# Patient Record
Sex: MALE | Race: OTHER | HISPANIC OR LATINO | Employment: FULL TIME | URBAN - METROPOLITAN AREA
[De-identification: names, ages, dates, MRNs, and addresses within clinical notes are randomized per-mention and may not be internally consistent; named-entity substitution may affect disease eponyms.]

---

## 2023-08-21 ENCOUNTER — OFFICE VISIT (OUTPATIENT)
Dept: URGENT CARE | Facility: CLINIC | Age: 58
End: 2023-08-21
Payer: COMMERCIAL

## 2023-08-21 VITALS
RESPIRATION RATE: 16 BRPM | SYSTOLIC BLOOD PRESSURE: 150 MMHG | WEIGHT: 198.41 LBS | TEMPERATURE: 98.6 F | HEART RATE: 66 BPM | DIASTOLIC BLOOD PRESSURE: 92 MMHG | OXYGEN SATURATION: 95 %

## 2023-08-21 DIAGNOSIS — H66.001 NON-RECURRENT ACUTE SUPPURATIVE OTITIS MEDIA OF RIGHT EAR WITHOUT SPONTANEOUS RUPTURE OF TYMPANIC MEMBRANE: ICD-10-CM

## 2023-08-21 DIAGNOSIS — Z75.8 DOES NOT HAVE PRIMARY CARE PROVIDER: ICD-10-CM

## 2023-08-21 PROCEDURE — 3077F SYST BP >= 140 MM HG: CPT

## 2023-08-21 PROCEDURE — 99203 OFFICE O/P NEW LOW 30 MIN: CPT

## 2023-08-21 PROCEDURE — 3080F DIAST BP >= 90 MM HG: CPT

## 2023-08-21 RX ORDER — AMOXICILLIN 500 MG/1
500 CAPSULE ORAL 2 TIMES DAILY
Qty: 10 CAPSULE | Refills: 0 | Status: SHIPPED | OUTPATIENT
Start: 2023-08-21 | End: 2023-08-26

## 2023-08-21 ASSESSMENT — ENCOUNTER SYMPTOMS
SORE THROAT: 1
SHORTNESS OF BREATH: 0
FEVER: 0
MYALGIAS: 0
COUGH: 1

## 2023-08-21 NOTE — PROGRESS NOTES
Subjective:     CHIEF COMPLAINT  Chief Complaint   Patient presents with    Otalgia     Persistent ear pain after OTC medication use.        HPI  Jak Campos is a very pleasant 57 y.o. male who presents with bilateral ear pain for several days.  He reports that he has had a cough, fatigue, and a sore throat which has almost completely resolved, but he does have a residual earache.  He tried taking an antibiotic from [place] that he believes may have been azithromycin.  He also has tried taking Robitussin for his cough with good relief of symptoms.  He is tolerating food and liquids normally.  He is not currently established with a primary care provider.    REVIEW OF SYSTEMS  Review of Systems   Constitutional:  Positive for malaise/fatigue. Negative for fever.   HENT:  Positive for ear pain and sore throat.    Respiratory:  Positive for cough. Negative for shortness of breath.    Cardiovascular:  Negative for chest pain.   Musculoskeletal:  Negative for myalgias.       PAST MEDICAL HISTORY  There are no problems to display for this patient.      SURGICAL HISTORY  patient denies any surgical history    ALLERGIES  Allergies   Allergen Reactions    Aspirin        CURRENT MEDICATIONS  Home Medications       Reviewed by Angie Vidal P.A.-C. (Physician Assistant) on 08/21/23 at 1520  Med List Status: <None>     Medication Last Dose Status        Patient Raul Taking any Medications                           SOCIAL HISTORY  Social History     Tobacco Use    Smoking status: Not on file    Smokeless tobacco: Not on file   Substance and Sexual Activity    Alcohol use: Not on file    Drug use: Not on file    Sexual activity: Not on file       FAMILY HISTORY  History reviewed. No pertinent family history.       Objective:     VITAL SIGNS: BP (!) 150/92   Pulse 66   Temp 37 °C (98.6 °F) (Temporal)   Resp 16   Wt 90 kg (198 lb 6.6 oz)   SpO2 95%     PHYSICAL EXAM  Physical Exam  Vitals reviewed.   Constitutional:        General: He is not in acute distress.     Appearance: Normal appearance. He is not ill-appearing or toxic-appearing.   HENT:      Head: Normocephalic and atraumatic.      Right Ear: Ear canal and external ear normal.      Left Ear: Tympanic membrane, ear canal and external ear normal.      Ears:      Comments: Moderate erythema of the right tympanic membrane with mild bulging     Mouth/Throat:      Mouth: Mucous membranes are moist.      Pharynx: Oropharynx is clear. No oropharyngeal exudate or posterior oropharyngeal erythema.   Eyes:      Conjunctiva/sclera: Conjunctivae normal.      Pupils: Pupils are equal, round, and reactive to light.   Cardiovascular:      Rate and Rhythm: Normal rate and regular rhythm.      Heart sounds: Normal heart sounds.   Pulmonary:      Effort: Pulmonary effort is normal. No respiratory distress.      Breath sounds: Normal breath sounds. No stridor. No wheezing, rhonchi or rales.   Skin:     General: Skin is warm and dry.   Neurological:      General: No focal deficit present.      Mental Status: He is alert and oriented to person, place, and time.   Psychiatric:         Mood and Affect: Mood normal.         Assessment/Plan:     1. Non-recurrent acute suppurative otitis media of right ear without spontaneous rupture of tympanic membrane  - amoxicillin (AMOXIL) 500 MG Cap; Take 1 Capsule by mouth 2 times a day for 5 days.  Dispense: 10 Capsule; Refill: 0    2. Does not have primary care provider  - Referral to establish with [facility] PCP  -Tylenol/ibuprofen OTC as needed for discomfort  -Rest and hydrate  -Follow-up with PCP regarding elevated blood pressure  -Return to clinic if symptoms worsen or fail to resolve    MDM/Comments:  Patient has stable vital signs and is non-toxic appearing. Discussed supportive care with hydration, rest, Tylenol/Ibuprofen as needed.  Patient prescribed amoxicillin for right otitis media.  Referral placed to become established with a PCP for  management of elevated blood pressure.  Patient demonstrated understanding of treatment plan at this time and will RTC if symptoms worsen or fail to resolve.     Differential diagnosis, natural history, supportive care, and indications for immediate follow-up discussed. All questions answered. Patient agrees with the plan of care.    Follow-up as needed if symptoms worsen or fail to improve to PCP, Urgent care or Emergency Room.    I have personally reviewed prior external notes and test results pertinent to today's visit.  I have independently reviewed and interpreted all diagnostics ordered during this urgent care acute visit.   Discussed management options (risks,benefits, and alternatives to treatment). Pt expresses understanding and the treatment plan was agreed upon. Questions were encouraged and answered to pt's satisfaction.    Please note that this dictation was created using voice recognition software. I have made a reasonable attempt to correct obvious errors, but I expect that there are errors of grammar and possibly content that I did not discover before finalizing the note.

## 2023-09-11 ENCOUNTER — TELEPHONE (OUTPATIENT)
Dept: HEALTH INFORMATION MANAGEMENT | Facility: OTHER | Age: 58
End: 2023-09-11
Payer: COMMERCIAL